# Patient Record
Sex: FEMALE | Race: BLACK OR AFRICAN AMERICAN | NOT HISPANIC OR LATINO | URBAN - METROPOLITAN AREA
[De-identification: names, ages, dates, MRNs, and addresses within clinical notes are randomized per-mention and may not be internally consistent; named-entity substitution may affect disease eponyms.]

---

## 2021-08-28 ENCOUNTER — TELEPHONE ENCOUNTER (OUTPATIENT)
Dept: URBAN - METROPOLITAN AREA CLINIC 13 | Facility: CLINIC | Age: 66
End: 2021-08-28

## 2021-08-29 ENCOUNTER — TELEPHONE ENCOUNTER (OUTPATIENT)
Dept: URBAN - METROPOLITAN AREA CLINIC 13 | Facility: CLINIC | Age: 66
End: 2021-08-29

## 2022-05-25 ENCOUNTER — OFFICE VISIT (OUTPATIENT)
Dept: URBAN - METROPOLITAN AREA CLINIC 44 | Facility: CLINIC | Age: 67
End: 2022-05-25
Payer: MEDICARE

## 2022-05-25 ENCOUNTER — DASHBOARD ENCOUNTERS (OUTPATIENT)
Age: 67
End: 2022-05-25

## 2022-05-25 VITALS
SYSTOLIC BLOOD PRESSURE: 150 MMHG | HEART RATE: 86 BPM | BODY MASS INDEX: 40.29 KG/M2 | TEMPERATURE: 97.3 F | WEIGHT: 241.8 LBS | DIASTOLIC BLOOD PRESSURE: 99 MMHG | HEIGHT: 65 IN

## 2022-05-25 DIAGNOSIS — R10.13 EPIGASTRIC PAIN: ICD-10-CM

## 2022-05-25 DIAGNOSIS — R11.14 BILIOUS VOMITING WITH NAUSEA: ICD-10-CM

## 2022-05-25 DIAGNOSIS — K20.90 ESOPHAGITIS: ICD-10-CM

## 2022-05-25 DIAGNOSIS — Z12.11 COLON CANCER SCREENING: ICD-10-CM

## 2022-05-25 PROCEDURE — 99204 OFFICE O/P NEW MOD 45 MIN: CPT | Performed by: PHYSICIAN ASSISTANT

## 2022-05-25 RX ORDER — RIVAROXABAN 20 MG/1
TAKE 1 TABLET (20 MG) BY ORAL ROUTE ONCE DAILY WITH THE EVENING MEAL TABLET, FILM COATED ORAL 1
Qty: 0 | Refills: 0 | Status: ACTIVE | COMMUNITY
Start: 1900-01-01

## 2022-05-25 RX ORDER — ASPIRIN 81 MG/1
TABLET, CHEWABLE ORAL
Qty: 0 | Refills: 0 | Status: ON HOLD | COMMUNITY
Start: 1900-01-01

## 2022-05-25 RX ORDER — UMECLIDINIUM BROMIDE AND VILANTEROL TRIFENATATE 62.5; 25 UG/1; UG/1
POWDER RESPIRATORY (INHALATION)
Qty: 60 | Status: ACTIVE | COMMUNITY

## 2022-05-25 RX ORDER — CHLORTHALIDONE 25 MG/1
TAKE 1 TABLET BY MOUTH ONCE DAILY IN THE MORNING WITH FOOD TABLET ORAL
Qty: 90 | Refills: 0 | Status: ON HOLD | COMMUNITY

## 2022-05-25 RX ORDER — POTASSIUM CHLORIDE 1500 MG/1
TABLET, EXTENDED RELEASE ORAL
Qty: 0 | Refills: 0 | Status: ON HOLD | COMMUNITY
Start: 1900-01-01

## 2022-05-25 RX ORDER — METOPROLOL TARTRATE 100 MG/1
TAKE 1 TABLET BY MOUTH TWICE DAILY WITH FOOD TABLET, FILM COATED ORAL
Qty: 180 | Refills: 1 | Status: ACTIVE | COMMUNITY

## 2022-05-25 RX ORDER — PANTOPRAZOLE SODIUM 40 MG/1
TABLET, DELAYED RELEASE ORAL
OUTPATIENT

## 2022-05-25 RX ORDER — METOCLOPRAMIDE 10 MG/1
TAKE 1 TABLET BY MOUTH 4 TIMES DAILY FOR 5 DAYS TABLET ORAL
Qty: 20 | Refills: 0 | Status: ON HOLD | COMMUNITY

## 2022-05-25 RX ORDER — GLIPIZIDE 5 MG/1
TAKE 1 TABLET BY MOUTH TWICE DAILY BEFORE MEAL(S) TABLET ORAL
Qty: 180 | Refills: 0 | Status: ON HOLD | COMMUNITY

## 2022-05-25 RX ORDER — INSULIN DETEMIR 100 [IU]/ML
INJECTION, SOLUTION SUBCUTANEOUS
Qty: 15 | Status: ACTIVE | COMMUNITY

## 2022-05-25 RX ORDER — PANTOPRAZOLE SODIUM 40 MG/1
TABLET, DELAYED RELEASE ORAL
Qty: 90 | Status: ACTIVE | COMMUNITY

## 2022-05-25 RX ORDER — ATORVASTATIN CALCIUM 10 MG/1
TAKE 1 TABLET (10 MG) BY ORAL ROUTE ONCE DAILY TABLET, FILM COATED ORAL 1
Qty: 0 | Refills: 0 | Status: ON HOLD | COMMUNITY
Start: 1900-01-01

## 2022-05-25 RX ORDER — SUCRALFATE 1 G/1
TAKE 1 TABLET BY MOUTH 4 TIMES DAILY BEFORE MEAL(S) AND NIGHTLY TABLET ORAL
Qty: 120 | Refills: 0 | Status: ON HOLD | COMMUNITY

## 2022-05-25 RX ORDER — POTASSIUM CHLORIDE 750 MG/1
TABLET, EXTENDED RELEASE ORAL
Qty: 30 | Status: ON HOLD | COMMUNITY

## 2022-05-25 RX ORDER — AMLODIPINE BESYLATE 5 MG/1
TAKE 1 TABLET BY MOUTH ONCE DAILY TABLET ORAL
Qty: 30 | Refills: 0 | Status: ON HOLD | COMMUNITY

## 2022-05-25 NOTE — HPI-TODAY'S VISIT:
66-year-old female presents for hospital f/u. She has past medical history of atrial fibrillation on Xarelto (sees Dr. Andrews), diabetes, COPD admitted to [facility] 5/10/22-5/13/22 for recurrent intractable nausea/vomiting/diarrhea x1 day and A. fib with RVR, and electrolyte imbalances.   She had recently hospitalized with similar symptoms suspicious for gastroparesis flare and treated with Reglan with improvement in symptoms.  She last underwent EGD November 2021 with findings of LA grade C esophagitis. She was seen in consult by [facility] who felt her symptoms were consistent with cyclical vomiting syndrome; repeat endoscopy not done and antiemtics advised with outpatient f/u. She states onset of N/V began about a year ago after starting Ozempic, but she was quickly taken off of it.    Nuclear medicine gastric emptying study 5/11/22 normal. CT abdomen and pelvis 5/10/22 with findings of colonic diverticulosis without inflammation; mention of stable 2.5 cm renal mass which would be better evaluated on contrasted CT.  Echo 3/2022 with normal EF of 60-65% and mild TR with mild increased PA pressure. No significant change from 2021.   Since discharge she is feeling well overall with mild, occasional abdominal pain, and no further N/V. She continues to take PPI daily and is not on Reglan. Bowels move regularly and she denies blood in stool or known family h/o CRC or polyps. She has never had a colonoscopy. No anemia.

## 2022-06-21 ENCOUNTER — OFFICE VISIT (OUTPATIENT)
Dept: URBAN - METROPOLITAN AREA SURGERY CENTER 28 | Facility: SURGERY CENTER | Age: 67
End: 2022-06-21

## 2025-07-08 ENCOUNTER — OFFICE VISIT (OUTPATIENT)
Dept: URBAN - METROPOLITAN AREA CLINIC 48 | Facility: CLINIC | Age: 70
End: 2025-07-08

## 2025-07-08 RX ORDER — PANTOPRAZOLE SODIUM 40 MG/1
1 TABLET 1/2 TO 1 HOUR BEFORE MORNING MEAL TABLET, DELAYED RELEASE ORAL ONCE A DAY
Status: ACTIVE | COMMUNITY

## 2025-07-08 RX ORDER — ATORVASTATIN CALCIUM 10 MG/1
1 TABLET TABLET, FILM COATED ORAL ONCE A DAY
Refills: 0 | Status: ACTIVE | COMMUNITY
Start: 1900-01-01

## 2025-07-08 RX ORDER — SUCRALFATE 1 G/1
1 TABLET ON AN EMPTY STOMACH TABLET ORAL
Qty: 120 TABLET | Refills: 0 | Status: ACTIVE | COMMUNITY

## 2025-07-08 RX ORDER — METOPROLOL TARTRATE 100 MG/1
1 TABLET WITH FOOD TABLET, FILM COATED ORAL
Refills: 1 | Status: ACTIVE | COMMUNITY

## 2025-07-08 RX ORDER — POTASSIUM CHLORIDE 1500 MG/1
1 TABLET WITH FOOD TABLET, EXTENDED RELEASE ORAL ONCE A DAY
Refills: 0 | Status: ACTIVE | COMMUNITY
Start: 1900-01-01

## 2025-07-08 RX ORDER — RIVAROXABAN 20 MG/1
1 TABLET WITH FOOD TABLET, FILM COATED ORAL ONCE A DAY
Refills: 0 | Status: ACTIVE | COMMUNITY
Start: 1900-01-01

## 2025-07-08 RX ORDER — GLIPIZIDE 5 MG/1
1 TABLET 30 MINUTES BEFORE BREAKFAST TABLET ORAL ONCE A DAY
Refills: 0 | Status: ACTIVE | COMMUNITY

## 2025-07-08 RX ORDER — INSULIN DETEMIR 100 [IU]/ML
DIRECTED INJECTION, SOLUTION SUBCUTANEOUS DAILY
Status: ACTIVE | COMMUNITY

## 2025-07-08 RX ORDER — CHLORTHALIDONE 25 MG/1
1 TABLET IN THE MORNING WITH FOOD TABLET ORAL DAILY
Qty: 90 TABLET | Refills: 0 | Status: ACTIVE | COMMUNITY

## 2025-07-08 RX ORDER — POTASSIUM CHLORIDE 750 MG/1
1 TABLET WITH FOOD TABLET, EXTENDED RELEASE ORAL TWICE A DAY
Qty: 60 TABLET | Status: ACTIVE | COMMUNITY

## 2025-07-08 RX ORDER — AMLODIPINE BESYLATE 5 MG/1
1 TABLET TABLET ORAL ONCE A DAY
Qty: 30 TABLET | Refills: 0 | Status: ACTIVE | COMMUNITY

## 2025-07-08 RX ORDER — UMECLIDINIUM BROMIDE AND VILANTEROL TRIFENATATE 62.5; 25 UG/1; UG/1
1 PUFF POWDER RESPIRATORY (INHALATION) ONCE A DAY
Status: ACTIVE | COMMUNITY

## 2025-07-08 RX ORDER — ASPIRIN 81 MG/1
1 TABLET TABLET, CHEWABLE ORAL ONCE A DAY
Refills: 0 | Status: ACTIVE | COMMUNITY
Start: 1900-01-01

## 2025-07-08 NOTE — HPI-TODAY'S VISIT:
70-year-old female presents for evaluation abdominal bloating.  Previously seen 5/2022.  Patient presented to ED/23/25 for back pain.  Labs in ED were unremarkable, with elevated BNP.  CT abdomen pelvis showed no acute abnormality; no GI findings in official report.  Patient was found to have left nephrolithiasis. 66-year-old female presents for hospital f/u. She has past medical history of atrial fibrillation on Xarelto (sees Dr. Andrews), diabetes, COPD admitted to [facility] 5/10/22-5/13/22 for recurrent intractable nausea/vomiting/diarrhea x1 day and A. fib with RVR, and electrolyte imbalances.   She had recently hospitalized with similar symptoms suspicious for gastroparesis flare and treated with Reglan with improvement in symptoms.  She last underwent EGD November 2021 with findings of LA grade C esophagitis. She was seen in consult by [facility] who felt her symptoms were consistent with cyclical vomiting syndrome; repeat endoscopy not done and antiemtics advised with outpatient f/u. She states onset of N/V began about a year ago after starting Ozempic, but she was quickly taken off of it.    Nuclear medicine gastric emptying study 5/11/22 normal. CT abdomen and pelvis 5/10/22 with findings of colonic diverticulosis without inflammation; mention of stable 2.5 cm renal mass which would be better evaluated on contrasted CT.  Echo 3/2022 with normal EF of 60-65% and mild TR with mild increased PA pressure. No significant change from 2021.   Since discharge she is feeling well overall with mild, occasional abdominal pain, and no further N/V. She continues to take PPI daily and is not on Reglan. Bowels move regularly and she denies blood in stool or known family h/o CRC or polyps. She has never had a colonoscopy. No anemia. At last visit, ordered HIDA scan, EGD pending clearance.  EGD was never completed.  Labs/23/25 were unremarkable.  TSH was normal 12/2024.  Fecal globin testing was negative in 9/2024.  Vitamin D level low 9/2024 at 16.  Patient had EGD 5/10/2021 with LA grade D esophagitis and evidence of Candida infection, residue in stomach despite several days of vomiting concerning for drug-induced gastroparesis, mild gastroduodenitis; recommended repeat EGD in 6 to 8 weeks.  No evidence of H. pylori on path.

## 2025-07-09 PROBLEM — 266433003: Status: ACTIVE | Noted: 2025-07-09

## 2025-07-10 ENCOUNTER — OFFICE VISIT (OUTPATIENT)
Dept: URBAN - METROPOLITAN AREA CLINIC 48 | Facility: CLINIC | Age: 70
End: 2025-07-10
Payer: MEDICARE

## 2025-07-10 ENCOUNTER — LAB OUTSIDE AN ENCOUNTER (OUTPATIENT)
Dept: URBAN - METROPOLITAN AREA CLINIC 48 | Facility: CLINIC | Age: 70
End: 2025-07-10

## 2025-07-10 ENCOUNTER — OFFICE VISIT (OUTPATIENT)
Dept: URBAN - METROPOLITAN AREA CLINIC 48 | Facility: CLINIC | Age: 70
End: 2025-07-10

## 2025-07-10 DIAGNOSIS — R14.0 ABDOMINAL BLOATING: ICD-10-CM

## 2025-07-10 DIAGNOSIS — Z12.11 SCREENING FOR COLON CANCER: ICD-10-CM

## 2025-07-10 DIAGNOSIS — K21.00 GASTROESOPHAGEAL REFLUX DISEASE WITH ESOPHAGITIS WITHOUT HEMORRHAGE: ICD-10-CM

## 2025-07-10 PROCEDURE — 99204 OFFICE O/P NEW MOD 45 MIN: CPT

## 2025-07-10 RX ORDER — METOCLOPRAMIDE 10 MG/1
TAKE 1 TABLET BY MOUTH 4 TIMES DAILY FOR 5 DAYS TABLET ORAL
Qty: 20 | Refills: 0 | Status: ACTIVE | COMMUNITY

## 2025-07-10 RX ORDER — GLIPIZIDE 5 MG/1
1 TABLET 30 MINUTES BEFORE BREAKFAST TABLET ORAL ONCE A DAY
Refills: 0 | Status: ACTIVE | COMMUNITY

## 2025-07-10 RX ORDER — RIVAROXABAN 20 MG/1
1 TABLET WITH FOOD TABLET, FILM COATED ORAL ONCE A DAY
Refills: 0 | Status: ACTIVE | COMMUNITY
Start: 1900-01-01

## 2025-07-10 RX ORDER — AMLODIPINE BESYLATE 5 MG/1
1 TABLET TABLET ORAL ONCE A DAY
Qty: 30 TABLET | Refills: 0 | Status: ACTIVE | COMMUNITY

## 2025-07-10 RX ORDER — ONDANSETRON HYDROCHLORIDE 4 MG/1
1 TABLET TABLET, FILM COATED ORAL ONCE A DAY
Qty: 30 | Refills: 1 | OUTPATIENT
Start: 2025-07-10

## 2025-07-10 RX ORDER — EZETIMIBE 10 MG/1
1 TABLET TABLET ORAL ONCE A DAY
Status: ACTIVE | COMMUNITY

## 2025-07-10 RX ORDER — INSULIN DETEMIR 100 [IU]/ML
DIRECTED INJECTION, SOLUTION SUBCUTANEOUS DAILY
Status: ACTIVE | COMMUNITY

## 2025-07-10 RX ORDER — SUCRALFATE 1 G/1
1 TABLET ON AN EMPTY STOMACH TABLET ORAL
Qty: 120 TABLET | Refills: 0 | Status: ACTIVE | COMMUNITY

## 2025-07-10 RX ORDER — SPIRONOLACTONE 25 MG/1
1 TABLET TABLET, FILM COATED ORAL ONCE A DAY
Status: ACTIVE | COMMUNITY

## 2025-07-10 RX ORDER — HYDROCHLOROTHIAZIDE 12.5 MG/1
1 TABLET IN THE MORNING TABLET ORAL ONCE A DAY
Status: ACTIVE | COMMUNITY

## 2025-07-10 RX ORDER — CLOPIDOGREL 75 MG/1
1 TABLET TABLET, FILM COATED ORAL ONCE A DAY
Status: ACTIVE | COMMUNITY

## 2025-07-10 RX ORDER — EMPAGLIFLOZIN 25 MG/1
1 TABLET TABLET, FILM COATED ORAL ONCE A DAY
Status: ACTIVE | COMMUNITY

## 2025-07-10 RX ORDER — DIGOXIN 125 UG/1
1 TABLET TABLET ORAL
Status: ACTIVE | COMMUNITY

## 2025-07-10 RX ORDER — CHLORTHALIDONE 25 MG/1
1 TABLET IN THE MORNING WITH FOOD TABLET ORAL DAILY
Qty: 90 TABLET | Refills: 0 | Status: ACTIVE | COMMUNITY

## 2025-07-10 RX ORDER — ASPIRIN 81 MG/1
1 TABLET TABLET, CHEWABLE ORAL ONCE A DAY
Refills: 0 | Status: ACTIVE | COMMUNITY
Start: 1900-01-01

## 2025-07-10 RX ORDER — UMECLIDINIUM BROMIDE AND VILANTEROL TRIFENATATE 62.5; 25 UG/1; UG/1
1 PUFF POWDER RESPIRATORY (INHALATION) ONCE A DAY
Status: ACTIVE | COMMUNITY

## 2025-07-10 RX ORDER — ATORVASTATIN CALCIUM 80 MG/1
1 TABLET TABLET, FILM COATED ORAL ONCE A DAY
Refills: 0 | Status: ACTIVE | COMMUNITY
Start: 1900-01-01

## 2025-07-10 RX ORDER — POTASSIUM CHLORIDE 1500 MG/1
1 TABLET WITH FOOD TABLET, EXTENDED RELEASE ORAL ONCE A DAY
Refills: 0 | Status: ACTIVE | COMMUNITY
Start: 1900-01-01

## 2025-07-10 RX ORDER — PANTOPRAZOLE SODIUM 40 MG/1
1 TABLET 1/2 TO 1 HOUR BEFORE MORNING MEAL TABLET, DELAYED RELEASE ORAL ONCE A DAY
Status: ACTIVE | COMMUNITY

## 2025-07-10 RX ORDER — METOPROLOL TARTRATE 100 MG/1
1 TABLET WITH FOOD TABLET, FILM COATED ORAL
Refills: 1 | Status: ACTIVE | COMMUNITY

## 2025-07-10 NOTE — PHYSICAL EXAM GASTROINTESTINAL
Abdomen , soft, tender in upper abd, nondistended , no guarding or rigidity , no masses palpable , normal bowel sounds Liver and Spleen , no hepatosplenomegaly Rectal deferred

## 2025-07-10 NOTE — HPI-TODAY'S VISIT:
70-year-old female presents for evaluation of R mid-abdominal pain x 2 weeks. Previously seen in 2022 for abdominal pain and vomiting. Patient was hospitalized in 2022 for suspected gastroparesis flare, and treated with Reglan with improvement. EGD 5/2021 with LA grade D esophagitis, food residue in stomach, mild gastroduodenitis with no H. pylori on path. Repeat EGD 11/2021 showed LA grade C esophagitis. Repeat EGD was not performed, and patient was discharged on antiemetics. Symptoms of N/V began about a year after starting Ozempic, but patient quickly discontinued GLP-1. GES 5/2022 was normal, and [facility] felt her symptoms were consistent with cyclic vomiting syndrome. CT abdomen pelvis 5/10/2022 with diverticulosis; non GI findings in official report. At last visit, patient continued on daily PPI with good control of dyspepsia symptoms, and was not taking Reglan. Advised to continue pantoprazole 40 mg daily, and ordered HIDA scan which was not completed. Also ordered EGD/colon, but patient did not have procedures. She presented to ED 4/23/2025 for back pain. Labs in ED were unremarkable, and CT abdomen pelvis showed no acute abnormality; non-GI findings in official report.  Currently, patient reports episode of R mid-abd pain x 2 weeks, which has now resolved. Pain was occuring when she ate or drinks water, with associated bloating and early satiety. She restarted on PPI a couple days ago, and has not experienced pain since then. She endorses frequent nausea but denies vomiting. No reflux, melena. Weight is stable. She is diabetic, on insulin and glipizide. No narcotic or GLP-1 medications. No NSAID use. Patient has her gallblader.  Patient has not had a colonoscopy, and she refuses colonoscopy or Cologuard today. She reports regular formed stools without rectal bleeding. Fecal globin was negative 9/2024.  Patient has history A-fib, on Eliquis and Plavix, followed by Dr. Mendez. COPD. No known kidney issues. No pacemaker/defibrillator, home O2, dialysis. Patient is diabetic, on insulin and glipizide.